# Patient Record
Sex: FEMALE | Race: WHITE | NOT HISPANIC OR LATINO | URBAN - METROPOLITAN AREA
[De-identification: names, ages, dates, MRNs, and addresses within clinical notes are randomized per-mention and may not be internally consistent; named-entity substitution may affect disease eponyms.]

---

## 2020-06-04 ENCOUNTER — RECORDS - HEALTHEAST (OUTPATIENT)
Dept: LAB | Facility: CLINIC | Age: 80
End: 2020-06-04

## 2020-06-05 LAB
ALBUMIN SERPL-MCNC: 4 G/DL (ref 3.5–5)
ALP SERPL-CCNC: 71 U/L (ref 45–120)
ALT SERPL W P-5'-P-CCNC: 22 U/L (ref 0–45)
ANION GAP SERPL CALCULATED.3IONS-SCNC: 10 MMOL/L (ref 5–18)
AST SERPL W P-5'-P-CCNC: 28 U/L (ref 0–40)
BILIRUB SERPL-MCNC: 0.7 MG/DL (ref 0–1)
BNP SERPL-MCNC: 66 PG/ML (ref 0–151)
BUN SERPL-MCNC: 16 MG/DL (ref 8–28)
CALCIUM SERPL-MCNC: 9.3 MG/DL (ref 8.5–10.5)
CHLORIDE BLD-SCNC: 102 MMOL/L (ref 98–107)
CO2 SERPL-SCNC: 29 MMOL/L (ref 22–31)
CREAT SERPL-MCNC: 0.83 MG/DL (ref 0.6–1.1)
GFR SERPL CREATININE-BSD FRML MDRD: >60 ML/MIN/1.73M2
GLUCOSE BLD-MCNC: 104 MG/DL (ref 70–125)
POTASSIUM BLD-SCNC: 3.8 MMOL/L (ref 3.5–5)
PROT SERPL-MCNC: 6.5 G/DL (ref 6–8)
SODIUM SERPL-SCNC: 141 MMOL/L (ref 136–145)
TSH SERPL DL<=0.005 MIU/L-ACNC: 1.48 UIU/ML (ref 0.3–5)
VIT B12 SERPL-MCNC: 750 PG/ML (ref 213–816)

## 2020-07-01 ENCOUNTER — PATIENT OUTREACH (OUTPATIENT)
Dept: CARE COORDINATION | Facility: CLINIC | Age: 80
End: 2020-07-01

## 2020-07-01 DIAGNOSIS — Z65.9 PSYCHOSOCIAL PROBLEM: Primary | ICD-10-CM

## 2020-07-01 ASSESSMENT — ACTIVITIES OF DAILY LIVING (ADL): DEPENDENT_IADLS:: COOKING;LAUNDRY;TRANSPORTATION

## 2020-07-01 NOTE — PROGRESS NOTES
"Clinic Care Coordination Contact    Clinic Care Coordination Contact  OUTREACH    Referral Information:  Referral Source: Care Team    Primary Diagnosis: Cognitive Impairment    Chief Complaint   Patient presents with     Clinic Care Coordination - Initial     CC RAULITO initial        Clinical Concerns:  Current Medical Concerns: Patient has a hard time walking due to her knees, so she uses a walk and recently her daughter purchased a wheelchair for transportation purposes.     Patient is incontinent, wears incontinent pads, she throws them in trash bags without disposing of the bags. Her daughter said she will smell like urine, her house will smell like urine, but patient isn't bathing often.   Patient is having a neurological examination done in July as suggest by Dr. Staples.   Patient's daughter Rosalinda would like to look into options for LTC.   CC RAULITO gave her Cody Noriega's number at [facility].     Current Behavioral Concerns: Patient has a problem with hoarding, her daughter states that her house is very unsafe due to all of the trash and items patient has collected. She is unable to get down the hallway. She has a very hard time throwing things away and doesn't want to.   Patient can't walk up and down steps so she is unable to stay with her daughter or son.   Patient will not family in her home, her daughter believes , \"she is ashamed of the mess.\"     Education Provided to patient: Introduced daughter to self and role. Discussed the process of POA and RAULITO suggested that daughter talk to her  about obtaining POA. RAULITO and Rosalinda discussed the process of Elderly Waiver and the different levels of care at assisted living facilities.    Pain  Pain (GOAL):: No  Health Maintenance Reviewed: Not assessed      Medication Management:  NA    Functional Status:  Patient is not fixing her own food as she is unable to cook for herself, patient eats a lot of fast food. When staying with her daughter she gained 8 " lbs due to her daughter ensuring that her mom had meals.   Dependent ADLs:: Eating  Dependent IADLs:: Cooking, Laundry, Transportation  Bed or wheelchair confined:: No  Mobility Status: Independent w/Device  Fallen 2 or more times in the past year?: Rosalinda believes that her mom has fall due to having black and blue marks.     Any fall with injury in the past year?: No    Living Situation:  Current living arrangement:: I live in a private home  [facility]    Lifestyle & Psychosocial Needs:    Diet:: Regular  Inadequate nutrition (GOAL):: Yes  Tube Feeding: No  Inadequate activity/exercise (GOAL):: No  Significant changes in sleep pattern (GOAL): No  Transportation means:: Family     Amish or spiritual beliefs that impact treatment:: No  Mental health DX:: Yes  Mental health DX how managed:: None  Informal Support system:: Family        Resources and Interventions:  Current Resources:  None at the moment        Supplies used at home:: Incontinence Supplies  Equipment Currently Used at Home: walker, rolling, cane, straight     Goals:   Goals        General    1. Transition to Long Term Care (pt-stated)     Notes - Note created  7/1/2020  3:53 PM by Joi Gardiner LSW    Goal Statement: Daughter will  help people transition into LTC housing.   Date Goal set: 7/1/2020  Barriers: Patient does not want to move out of her home.   Strengths: Strong family support  Date to Achieve By: 10/1/2020  Patient expressed understanding of goal: Yes  Action steps to achieve this goal:  1. Daughter will call resources to assist in patient transitioning to long term care  2. Daughter will talk to  about gaining POA.   3. Daughter will call SW if patient has concerns or questions.       2.Other     Notes - Note created  7/1/2020  3:58 PM by Joi Gardiner LSW    Goal Statement: Patient's house will be cleaned due to hoarding.   Date Goal set: 7/1/2020  Barriers: Patient not wanting  belongings to be thrown away.   Strengths: Strong Family Support  Date to Achieve By: 9/1/2020  Patient expressed understanding of goal: Yes  Action steps to achieve this goal:  1. Patient's family will work with CC SW to find a company to clean patient's due to hoarding.   2. Daughter will call SW if any questions or concerns arise.               Patient/Caregiver understanding: Patient verbalized understanding, engaged in AIDET communication during patient encounter.    Outreach Frequency: monthly       Plan: Rosalinda to call Cody Noriega re: options for long term care facilities. CC SW to find cleaning company to assist in cleaning patients house. SW to assist Rosalinda in finding patient Long term Care for the patient.     Will call Rosalinda in 2 weeks for update after neurological exam.       TEAGAN Hernandez  Clinic Care Coordinator  [phone]  [email or web address]

## 2020-07-01 NOTE — PROGRESS NOTES
Clinic Care Coordination Contact  [facility]/Voicemail    Referral Source: Care Team  Clinical Data: Care Coordinator Outreach  Outreach attempted x 1.  Left message on patient's  daughter's voicemail with call back information and requested return call.  Plan:  Care Coordinator will try to reach patient again in 1-2 business days.    TEAGAN Hernandez  Clinic Care Coordinator  [phone]  [email or web address]

## 2020-07-09 ENCOUNTER — PATIENT OUTREACH (OUTPATIENT)
Dept: CARE COORDINATION | Facility: CLINIC | Age: 80
End: 2020-07-09

## 2020-07-09 DIAGNOSIS — Z65.9 PSYCHOSOCIAL PROBLEM: Primary | ICD-10-CM

## 2020-07-09 NOTE — PROGRESS NOTES
"Clinic Care Coordination Contact    Follow Up Progress Note      Assessment: MOHIT CABEZAS called patient's daughter due to patient calling office today stating that someone from the office was calling her, patient also wanted to know what referrals she had scheduled. Patient did not go to her psychiatric evaluation due to anxiety, Brigitte stated that she had high anxiety and got diarrhea so she refused to go. Brigitte rescheduled appointment for 7/30/2020.     Brigitte and family have concerns regarding her living in her home due to her excessive hoarding. Patient is unsafe with mobility in her home, patient refuses a life alert, refuses to let anyone clean her home. It is too much for the patient to clean at this point due to the amount of \"stuff\" she has in her home. Patient does not let family in the home as well.    Brigitte states that patient doesn't bathe and this is concerning to her. Patient does not have washing machine or dryer, but wont let her family wash her clothes or get her a new washer or dryer.     Intervention/Education provided during outreach: Discussed with Brigitte about making a [facility] report due to safety concerns. Patient can't get to stove to make herself food, is unable to get to refrigerator, and there is not a clear path for patient to walk around in her house. Patient is sleeping on the couch due to not being able to get to her bedroom. Patient has garbage all over her yard and home. Discussed getting patient on MA so that Brigitte can look into Long term care facilities, but at this point patient states she will not move out of home. Brigitte would like to transition patient to long term care for safety needs.      Outreach Frequency: monthly    Plan: MOHIT CABEZAS to make VA report due to patient's safety concerns. Will update  regarding patient's anxiety with going to psychiatric evaluation, rescheduled 7/30/2020. Will message  for advisement on next steps.     Care Coordinator will follow up in 3 weeks. "       TEAGAN Hernandez  Clinic Care Coordinator  [phone]  [email or web address]

## 2020-07-22 ENCOUNTER — PATIENT OUTREACH (OUTPATIENT)
Dept: CARE COORDINATION | Facility: CLINIC | Age: 80
End: 2020-07-22

## 2020-07-22 NOTE — PROGRESS NOTES
Clinic Care Coordination Contact  Care Team Conversations    RAULITO received call from Kira Winstoneny from [facility] ([phone]). She was calling to inquire additional information regarding patient safety/ needs. Kira wanted to know if patient had mobility issues and if this contributed to her being unsafe in her home in addition to the hoarding. RAULITO told Kira that she does have gait and mobility issues and with her home being cluttered without a safe path puts the patient at risk. Kira said the next step would be to seek guardianship for patient, she asked if patient's daughter had guardianship. At this point RAULITO is aware that Rosalinda has POA, but not guardianship. Kira would like to seek a Physicians Support Statement to help Rosalinda gain guardianship; if Rosalinda would to be amenable to guardianship.     Kira to call Rosalinda and discuss next steps. Kira emailed RAULITO the form for the physician to fill out. RAULITO to discuss guardianship paperwork   with PCP.   TEAGAN Hernandez  Clinic Care Coordinator  [phone]  [email or web address]

## 2020-07-29 ENCOUNTER — PATIENT OUTREACH (OUTPATIENT)
Dept: CARE COORDINATION | Facility: CLINIC | Age: 80
End: 2020-07-29

## 2020-07-29 NOTE — PROGRESS NOTES
Clinic Care Coordination Contact  [facility]/Voicemail       Clinical Data: Care Coordinator Outreach  Outreach attempted x 1.  Left message on patient's daughter; Rosalinda's  voicemail with call back information and requested return call.  Plan:Care Coordinator will try to reach patient again in 3-5 business days.      TEAGAN Hernandez  Clinic Care Coordinator  [phone]  [email or web address]

## 2020-07-31 ENCOUNTER — PATIENT OUTREACH (OUTPATIENT)
Dept: CARE COORDINATION | Facility: CLINIC | Age: 80
End: 2020-07-31

## 2020-07-31 DIAGNOSIS — Z65.9 PSYCHOSOCIAL PROBLEM: Primary | ICD-10-CM

## 2020-07-31 NOTE — PROGRESS NOTES
Clinic Care Coordination Contact    Follow Up Progress Note      Assessment:  Patient's daughter called SW this morning to update on patient's [facility] evaluation. Rosalinda stated that the outcome of the appointment was that the patient is exhibiting signs of frontal lobe dementia and the doctor suggests that the patient move into a LTC facility specifically into memory care. Rosalinda states that she was not expecting patient to have to go into memory care, but writer and SW discussed the positives about this transition. Writer gave patient the number for [facility]: [phone] to discuss next steps in proceeding with the long term care options.   Rosalinda stated that as of now there has not been a inspection scheduled for the patient's home. Rosalinda discussed that she and her brother have full POA and aren't sure about gaining guardianship. She believes that if she get her mom (patient) into a LTC then guardianship will not be needed.     Goals addressed this encounter:   Goals Addressed                 This Visit's Progress       Patient Stated      1. Transition to Long Term Care (pt-stated)   On track     Goal Statement: Daughter will  help people transition into LTC housing.   Date Goal set: 7/1/2020  Barriers: Patient does not want to move out of her home.   Strengths: Strong family support  Date to Achieve By: 10/1/2020  Patient expressed understanding of goal: Yes  Action steps to achieve this goal:  1. Daughter will call resources to assist in patient transitioning to long term care  2. Daughter will talk to  about gaining POA.   3. Daughter will call SW if patient has concerns or questions.          Other      2.Other   On Hold     Goal Statement: Patient's house will be cleaned due to hoarding.   Date Goal set: 7/1/2020  Barriers: Patient not wanting belongings to be thrown away.   Strengths: Strong Family Support  Date to Achieve By: 9/1/2020  Patient expressed understanding of goal: Yes  Action  steps to achieve this goal:  1. Patient's family will work with CC RAULITO to find a company to clean patient's due to hoarding.   2. Daughter will call SW if any questions or concerns arise.                Intervention/Education provided during outreach: SW validated Rosalinda's concerns and helped create a plan for next steps. Rosalinda is to call [facility] and look into LTC. RAULITO to send [employer]al[employer] to plan on having a company come out to clean the home. Rosalinda believes this would be very mentally exhausting and draining for her and her family to do, so having an outside company assist with this would be very helpful.      Outreach Frequency: monthly    Plan: Rosalinda to call [facility] to get the process started for housing. SW to sent Rosalinda cleaning companies that specialize in hoarding.   Care Coordinator will follow up in 1 week.     TEAGAN Hernandez  Clinic Care Coordinator  [phone]  [email or web address]

## 2020-08-04 NOTE — PROGRESS NOTES
Clinic Care Coordination Contact  Care Team Conversations  Late Entry 7/31/2020  Cody Noriega From [facility] called CC SW to state that he had received a call from patient's daughter Rosalinda. He will be working with the family to help the find a LTC facility that fits the patients needs and finances.     TEAGAN Hernandez  Clinic Care Coordinator  [phone]  [email or web address]

## 2020-08-11 ENCOUNTER — PATIENT OUTREACH (OUTPATIENT)
Dept: CARE COORDINATION | Facility: CLINIC | Age: 80
End: 2020-08-11

## 2020-08-11 DIAGNOSIS — Z65.9 PSYCHOSOCIAL PROBLEM: Primary | ICD-10-CM

## 2020-08-11 SDOH — ECONOMIC STABILITY: FOOD INSECURITY: WITHIN THE PAST 12 MONTHS, YOU WORRIED THAT YOUR FOOD WOULD RUN OUT BEFORE YOU GOT MONEY TO BUY MORE.: NEVER TRUE

## 2020-08-11 SDOH — ECONOMIC STABILITY: INCOME INSECURITY: HOW HARD IS IT FOR YOU TO PAY FOR THE VERY BASICS LIKE FOOD, HOUSING, MEDICAL CARE, AND HEATING?: NOT HARD AT ALL

## 2020-08-11 SDOH — ECONOMIC STABILITY: TRANSPORTATION INSECURITY
IN THE PAST 12 MONTHS, HAS LACK OF TRANSPORTATION KEPT YOU FROM MEETINGS, WORK, OR FROM GETTING THINGS NEEDED FOR DAILY LIVING?: NO

## 2020-08-11 SDOH — SOCIAL STABILITY: SOCIAL INSECURITY
WITHIN THE LAST YEAR, HAVE YOU BEEN KICKED, HIT, SLAPPED, OR OTHERWISE PHYSICALLY HURT BY YOUR PARTNER OR EX-PARTNER?: NOT ASKED

## 2020-08-11 SDOH — HEALTH STABILITY: PHYSICAL HEALTH: ON AVERAGE, HOW MANY MINUTES DO YOU ENGAGE IN EXERCISE AT THIS LEVEL?: 0 MIN

## 2020-08-11 SDOH — SOCIAL STABILITY: SOCIAL INSECURITY
WITHIN THE LAST YEAR, HAVE TO BEEN RAPED OR FORCED TO HAVE ANY KIND OF SEXUAL ACTIVITY BY YOUR PARTNER OR EX-PARTNER?: NOT ASKED

## 2020-08-11 SDOH — ECONOMIC STABILITY: FOOD INSECURITY: WITHIN THE PAST 12 MONTHS, THE FOOD YOU BOUGHT JUST DIDN'T LAST AND YOU DIDN'T HAVE MONEY TO GET MORE.: NEVER TRUE

## 2020-08-11 SDOH — HEALTH STABILITY: PHYSICAL HEALTH: ON AVERAGE, HOW MANY DAYS PER WEEK DO YOU ENGAGE IN MODERATE TO STRENUOUS EXERCISE (LIKE A BRISK WALK)?: 0 DAYS

## 2020-08-11 SDOH — ECONOMIC STABILITY: TRANSPORTATION INSECURITY
IN THE PAST 12 MONTHS, HAS THE LACK OF TRANSPORTATION KEPT YOU FROM MEDICAL APPOINTMENTS OR FROM GETTING MEDICATIONS?: NO

## 2020-08-11 SDOH — SOCIAL STABILITY: SOCIAL INSECURITY
WITHIN THE LAST YEAR, HAVE YOU BEEN HUMILIATED OR EMOTIONALLY ABUSED IN OTHER WAYS BY YOUR PARTNER OR EX-PARTNER?: NOT ASKED

## 2020-08-11 SDOH — SOCIAL STABILITY: SOCIAL INSECURITY: WITHIN THE LAST YEAR, HAVE YOU BEEN AFRAID OF YOUR PARTNER OR EX-PARTNER?: NOT ASKED

## 2020-08-11 NOTE — PROGRESS NOTES
"Clinic Care Coordination Contact    Follow Up Progress Note      Assessment: RAULITO called patient's daughter for update. She stated that her and her brother continue to work with Kira Lobo from [facility].   Rosalinda states,  \"that it has not been easy finding a LTC that takes Elderly Waiver.\" She would like to be able to use Elderly Waiver to help pay for the long term care facility. Rosalinda is working with Cody Noriega ( [facility]) on finding facilities that take EW. Rosalinda has put a hold on a facility called [facility] in [place] and has her mom on waiting list with the same facility in [place].  She has two virtual tours scheduled as well with two different facilities.     Rosalinda is making sure that the facility has memory care as she is aware that the patient's dementia can decline quickly and would like to have the ability to have a memory care available.     Kira had an inspection of the home scheduled for the past Monday, 8/10/2020. Rosalinda stated \"that her brother was angry about this as he was out of town and he wanted to be there and didn't believe it was necessary for the crisis team to be involved.\" Rosalinda discussed this with Kira and it was explained that, \" It is easier when family isn't there as it keeps the family out of process and the crisis team is there incase the patient has a reaction to being told she can't return to her home. Rosalinda told writer \"she understands this, it's just been very difficult process.\"     At this time the patient is not talking to Rosalinda due to the patient receiving a bill from the   Rosalinda was working with; the bill noted [employer] was involved with the patient. The patient read this and since has not spoken to Rosalinda.     Patient has an appointment scheduled with her PCP tomorrow,  Rosalinda stated \"that she plans on attending.\"   Goals addressed this encounter:   Goals Addressed                 This Visit's Progress       Patient Stated      1. " "Transition to Long Term Care (pt-stated)   On track     Goal Statement: Daughter will help patient transition into LTC housing within the next 4 months.   Date Goal set: 7/1/2020  Barriers: Patient does not want to move out of her home.   Strengths: Strong family support  Date to Achieve By: 10/1/2020  Patient expressed understanding of goal: Yes  Action steps to achieve this goal:  1. Daughter will call resources to assist in patient transitioning to long term care  2. Daughter will talk to  about gaining POA.   3. Daughter will call SW if patient has concerns or questions.          Other      2.Other   On track     Goal Statement: Patient's house will be cleaned due to hoarding with in the next 3 months.  Date Goal set: 7/1/2020  Barriers: Patient not wanting belongings to be thrown away, anxiety  Strengths: Strong Family Support  Date to Achieve By: 9/1/2020  Patient expressed understanding of goal: Yes  Action steps to achieve this goal:  1. Patient's family will work with  SW to find a company to clean patient's due to hoarding.   2. Daughter will call SW if any questions or concerns arise.                Intervention/Education provided during outreach: Discussed the process of utilizing EW for LTC. Provided supportive listening and validation of the process of getting the patient into a safer environment.      Outreach Frequency: weekly    Plan: Patient to attend her appointment on 8/12/2020. Rosalinda and her family will continue to work with Kira Lobo. Rosalinda to update RAULITO with any news about finding a LTC.   Care Coordinator will follow up in 1 week.     TEAGAN Hernandez  Clinic Care Coordinator  [phone]  [email or web address]      Clinic Care Coordination Contact  Care Team Conversations    RAULITO wrote an email to Kira Lobo stating, \"Have you rescheduled the inspection? I spoke to Rosalinda and she said it was scheduled, but now it has been rescheduled.\"    Kira responded, \" I have not yet.  I " "told family I would wait for the Neurology report.\" Kira requested the patient's recently neuro psych exam.     SW to wait for update from Rosalinda and or Kira Lobo for next steps.           "

## 2020-08-21 ENCOUNTER — PATIENT OUTREACH (OUTPATIENT)
Dept: CARE COORDINATION | Facility: CLINIC | Age: 80
End: 2020-08-21

## 2020-08-21 DIAGNOSIS — Z65.9 PSYCHOSOCIAL PROBLEM: Primary | ICD-10-CM

## 2020-08-21 NOTE — LETTER
[facility]  Complex Care Plan  About Me:    Patient Name:  Wayne Zavala    YOB: 1940  Age:         80 year old   Mandeep MRN:    [id number] Telephone Information:  Home Phone [phone]   Mobile Not on file.       Address:  [address] [facility] [address] Email address:  No e-mail address on record      Emergency Contact(s)    Name Relationship Lgl Grd Work Phone Home Phone Mobile Phone   1. MAYDA ZAVALA*    [phone]    2. RAMOS ZAVALA*    [phone]            Primary language:  English     needed? Data Unavailable   [employer]:  [phone] op. 1  Other communication barriers: None  Preferred Method of Communication:     Current living arrangement:    Mobility Status/ Medical Equipment:      Health Maintenance  Health Maintenance Reviewed: Up to date    My Access Plan  Medical Emergency 911   Primary Clinic Line [facility] - [phone]   24 Hour Appointment Line [phone] or  [phone]) (toll-free)   24 Hour Nurse Line [phone] (toll-free)   Preferred Urgent Care     Preferred Hospital     Preferred Pharmacy No Pharmacies Listed   Behavioral Health Crisis Line The National Suicide Prevention Lifeline at 1-224.197.3937 or 913             My Care Team Members  Patient Care Team       Relationship Specialty Notifications Start End    Kuldip Staples MD PCP - General Family Practice  8/21/20     Phone: [phone] Fax: [phone]         [facility] [address] [facility] [address]    Joi Gardiner LSW Lead Care Coordinator   7/1/20             My Care Plans  Self Management and Treatment Plan  Goals and (Comments)  Goals        General    1. Transition to Long Term Care (pt-stated)     Notes - Note edited  8/11/2020  2:40 PM by Joi Gardiner LSW    Goal Statement: Daughter will help patient transition into LTC housing within the  next 4 months.   Date Goal set: 7/1/2020  Barriers: Patient does not want to move out of her home.   Strengths: Strong family support  Date to Achieve By: 10/1/2020  Patient expressed understanding of goal: Yes  Action steps to achieve this goal:  1. Daughter will call resources to assist in patient transitioning to long term care  2. Daughter will talk to  about gaining POA.   3. Daughter will call SW if patient has concerns or questions.       2.Other     Notes - Note edited  8/11/2020  2:39 PM by Joi Gardiner LSW    Goal Statement: Patient's house will be cleaned due to hoarding with in the next 3 months.  Date Goal set: 7/1/2020  Barriers: Patient not wanting belongings to be thrown away, anxiety  Strengths: Strong Family Support  Date to Achieve By: 9/1/2020  Patient expressed understanding of goal: Yes  Action steps to achieve this goal:  1. Patient's family will work with [facility] to find a company to clean patient's due to hoarding.   2. Daughter will call SW if any questions or concerns arise.                     Advance Care Plans/Directives Type:        My Medical and Care Information  Problem List   There is no problem list on file for this patient.     Current Medications and Allergies:  See printed Medication Report.    Care Coordination Start Date: 7/1/2020   Frequency of Care Coordination: weekly   Form Last Updated: 08/21/2020

## 2020-08-21 NOTE — PROGRESS NOTES
"Clinic Care Coordination Contact    Follow Up Progress Note:     Assessment: Patient called patient's daughter today on updates on LTC facilities. Rosalinda has done multiple virtual tours with LTC facilities; she states she has seen/ talked to 14 places. Rosalinda confirmed that Kira Lobo has all the documents she needs with going forward.  Rosalinda filled SW in on doctor appointment with patient, Rosalinda stated, \" it was a disaster.\" Patient hung up on PC during the tele visit. Patient to make an appointment with Dr. Staples in person to discuss dementia dx. Rosalinda is getting frustrated with the process in looking at facilities.     Kiar Lobo has not scheduled inspection yet, she is waiting to hear from Rosalinda to ensure patient has housing. Rosalinda has a few options, has put a hold on a place. She is just waiting to hear back from the facilities.     Goals addressed this encounter:   Goals Addressed                 This Visit's Progress       Patient Stated      1. Transition to Long Term Care (pt-stated)   40%     Goal Statement: Daughter will help patient transition into LTC housing within the next 4 months.   Date Goal set: 7/1/2020  Barriers: Patient does not want to move out of her home.   Strengths: Strong family support  Date to Achieve By: 10/1/2020  Patient expressed understanding of goal: Yes  Action steps to achieve this goal:  1. Daughter will call resources to assist in patient transitioning to long term care  2. Daughter will talk to  about gaining POA.   3. Daughter will call SW if patient has concerns or questions.          Other      2.Other   On Hold     Goal Statement: Patient's house will be cleaned due to hoarding with in the next 3 months.  Date Goal set: 7/1/2020  Barriers: Patient not wanting belongings to be thrown away, anxiety  Strengths: Strong Family Support  Date to Achieve By: 9/1/2020  Patient expressed understanding of goal: Yes  Action steps to achieve this goal:  1. Patient's family " will work with CC SW to find a company to clean patient's due to hoarding.   2. Daughter will call SW if any questions or concerns arise.                Intervention/Education provided during outreach: Discussed next steps; Rosalinda is continuing to look at LTC facilities, she is waiting to hear back from facilities.   Outreach Frequency: weekly    Plan: Rosalinda to continue looking at LTC facilities and work with Kira Maciel. Kira to schedule inspection and crisis team to go to house.  SW to follow up with Rosalinda in 2 weeks to check how things are going.   Care Coordinator will follow up in 2 weeks.       TEAGAN Hernandez  Clinic Care Coordinator  [phone]  [email or web address]

## 2020-08-21 NOTE — LETTER
[facility]   August 21, 2020      Dear Rosalinda,    I am a clinic  who works with Kuldip Staples MD at [facility]. I wanted to thank you for spending the time to talk with me.  Below is a description of clinic care coordination and how I can further assist you.      The goal of clinic care coordination is to help you manage your health and improve access to the health care system in the most efficient manner. The team can assist you in meeting your health care goals by providing education, coordinating services, strengthening the communication among your providers and supporting you with any resource needs.    Please feel free to contact me at [phone] with any questions or concerns. We are focused on providing you with the highest-quality healthcare experience possible and that all starts with you.     Sincerely,     TEAGAN Hernandez  Clinic Care Coordinator  [phone]  [email or web address]

## 2020-08-27 ENCOUNTER — PATIENT OUTREACH (OUTPATIENT)
Dept: CARE COORDINATION | Facility: CLINIC | Age: 80
End: 2020-08-27

## 2020-08-27 DIAGNOSIS — Z65.9 PSYCHOSOCIAL PROBLEM: Primary | ICD-10-CM

## 2020-08-27 NOTE — PROGRESS NOTES
Clinic Care Coordination Contact    Follow Up Progress Note      Assessment: Patient's daughter called to update  RAULITO on her progress on finding a LTC. Patient's daughter has a place that she really likes and has put down a deposit to hold it. She has also has a back up place that she likes incase the first place doesn't work out. Rosalinda has been told that the patient will most likely need to be admitted into the hospital first to have an assessment and be regulated prior to being admitted into the LTC because the patient is not a willing participant in the process of moving out of her home. Writer encouraged Rosalinda to check with Kira Lobo from the [facility] for next steps and to update her on where they are at as far as housing options.     Goals addressed this encounter:   Goals Addressed                 This Visit's Progress       Patient Stated      1. Transition to Long Term Care (pt-stated)   50%     Goal Statement: Daughter will help patient transition into LTC housing within the next 4 months.   Date Goal set: 7/1/2020  Barriers: Patient does not want to move out of her home.   Strengths: Strong family support  Date to Achieve By: 10/1/2020  Patient expressed understanding of goal: Yes  Action steps to achieve this goal:  1. Daughter will call resources to assist in patient transitioning to long term care  2. Daughter will talk to  about gaining POA.   3. Daughter will call  if patient has concerns or questions.     8/27/2020- Patient's daughter has a hold on two long term care facilities. She is waiting to hear from the facilities on their decision if they are going to accept her. Goal 50%         Other      2.Other   On Hold     Goal Statement: Patient's house will be cleaned due to hoarding with in the next 3 months.  Date Goal set: 7/1/2020  Barriers: Patient not wanting belongings to be thrown away, anxiety  Strengths: Strong Family Support  Date to Achieve By: 9/1/2020  Patient expressed  understanding of goal: Yes  Action steps to achieve this goal:  1. Patient's family will work with CC SW to find a company to clean patient's due to hoarding.   2. Daughter will call SW if any questions or concerns arise.                Intervention/Education provided during outreach: Discussed next steps of patient going into a LTC.      Outreach Frequency: weekly    Plan:  Rosalinda to update Kira on where she is on finding a LTC and inquire what the next steps are with the county. Rosalinda to update CC SW and make CC SW aware what next steps are. CC SW will update PCP when writer knows next steps .   Care Coordinator will follow up in 3 weeks.

## 2020-09-10 ENCOUNTER — PATIENT OUTREACH (OUTPATIENT)
Dept: CARE COORDINATION | Facility: CLINIC | Age: 80
End: 2020-09-10

## 2020-09-10 NOTE — PROGRESS NOTES
"Clinic Care Coordination Contact    Follow Up Progress Note      Assessment: MOHIT CABEZAS received e-mail from Kira Lobo from [facility] at 7:56am asking, \"I am hoping we can get a letter from Wayne's doctor ASAP requesting that she be held and evaluated at a Geriatric Psych Unit.I am coordinating with Rosalinda and our Crisis Unit and Police to have her removed from the home today.  We are planning on having her transported to [facility] and are hoping she can be held and evaluated by their Geriatric Psych Unit because then [facility], the locked memory care unit has agreed to admit Wayne.  All of these moving parts need to happen successfully and it would really help if we could get a letter from her Primary Care physician supporting this and requesting the evaluation at Geriatric Psych also.  Can you please ask this of him ASAP as we were planning on acting on this today.    MOHIT CABEZAS relayed this message to PCP- PCP wrote letter and it was sent to Kira at 8:38.     10:00 - Kira e-mailed MOHIT CABEZAS to state they were all headed to the patient's home at 11:00am.    1:12pm- E-mail was received from Kira,\" It is sounding a little like [employer] does not feel she needs Geriatric Psych Eval and it may help to have her physician call up and talk.\"    1:04PM- MOHIT CABEZAS noted that  Dr Staples noted in patient's chart;\" d/w ER doc. She will be transferred to memory care or to geriatric psych input unit for evaluation.\"    2:00 PM- MOHIT CABEZAS called Rosalinda to check in with her while she was at the ED with the patient. She stated that the psychiatrist at the hospital did not think the patient needed to be there and would not be putting the patient in geriatric psych. Rosalinda said she didn't know what she is going to do if the patient wasn't admitted. She stated that the staff wants her to bring the patient home with her until she can go to [facility]. Rosalinda told the staff and writer that the patient is unable to use her stairs in her home " "and she doesn't feel it's safe to bring her to her home. She is not sure what to do, writer told Rosalinda that  RAULITO would email Kira.     2:15-  RAULITO wrote an email to Kira stating that due to patient not being admitted to the hospital, Rosalinda doesn't know what to do, what are the next steps.       2:30pm - Kira Maciel called RAULITO to update MOHIT CABEZAS. Kira stated that she met patient at her house unscheduled, the patient was not aware that Kira and her team were arriving, upon arrival the patient was well dressed, clean, she appropriately answered questions and was participating in everything Kira asked. She went willingly to [facility] and upon arrival she was given a psych assessment, the crisis team did not have to participate as Rosalinda and Kira anticipated. Up to this point Kira had only had conversations with patient's and daughter, she had no contact with the patient up to meeting her yesterday. She felt surprised by patient's willingness to engage and participate as she had been informed by family that the patient would be angry and unwilling to leave her home. The patient's home was labeled inhabitable.   The psychiatrist who did the assessment stated that there is no reason to keep the patient on a hold as she tested well on the ED [facility] assessment. Kira stated, \" She did not see any deficits, the patient participated in all tests willingly and the physicians in the ED were questioning her diagnosis of dementia.\" Kira stated\" that she felt that there is a missing piece to this situation as she feels that the patient is perfectly capable of making her own decisions and she is questioning whether she is a vulnerable adult.\"  Kira has updated and expressed her concerns with Yamileth at [facility] as Kira does not feel the patient needs to be in a locked memory care facility. Yamileth stated that patient will receive an assessment once she arrives and if they feel she does not qualify for memory care then " they will move her into assisted living. Kira stated that she feels very uncomfortable with the situation as she feels that the patient's behaviors were exaggerated by the patient's children.     Goals addressed this encounter:   Goals Addressed                 This Visit's Progress       Patient Stated      1. Transition to Long Term Care (pt-stated)   60%     Goal Statement: Daughter will help patient transition into LTC housing within the next 4 months.   Date Goal set: 7/1/2020  Barriers: Patient does not want to move out of her home.   Strengths: Strong family support  Date to Achieve By: 10/1/2020  Patient expressed understanding of goal: Yes  Action steps to achieve this goal:  1. Daughter will call resources to assist in patient transitioning to long term care  2. Daughter will talk to  about gaining POA.   3. Daughter will call SW if patient has concerns or questions.     9/10/2020 Patient has been transferred from her home to [facility] for potential inpatient treatment into the geriatric psychiatric unit. Goal 50%         Other      2.Other   On Hold     Goal Statement: Patient's house will be cleaned due to hoarding with in the next 3 months.  Date Goal set: 7/1/2020  Barriers: Patient not wanting belongings to be thrown away, anxiety  Strengths: Strong Family Support  Date to Achieve By: 9/1/2020  Patient expressed understanding of goal: Yes  Action steps to achieve this goal:  1. Patient's family will work with MOHIT CABEZAS to find a company to clean patient's due to hoarding.   2. Daughter will call SW if any questions or concerns arise.                   Outreach Frequency: weekly    Plan:   - MOHIT CABEZAS to wait for updates from Kira  -MOHIT CABEZAS to update patient's PCP.   Care Coordinator will follow up in 1 week.      TEAGAN Hernandez  Clinic Care Coordinator  [phone]  [email or web address]

## 2020-09-18 ENCOUNTER — PATIENT OUTREACH (OUTPATIENT)
Dept: CARE COORDINATION | Facility: CLINIC | Age: 80
End: 2020-09-18

## 2020-09-18 NOTE — PROGRESS NOTES
Clinic Care Coordination Contact  [facility]/Voicemail       Clinical Data: Care Coordinator Outreach  Outreach attempted x 1.  Left message on patient's daughter's voicemail with call back information and requested return call.  Plan:  Care Coordinator will try to reach patient again in 1 week.    TEAGAN Hernandez  Clinic Care Coordinator  [phone]  [email or web address]

## 2020-09-29 ENCOUNTER — PATIENT OUTREACH (OUTPATIENT)
Dept: CARE COORDINATION | Facility: CLINIC | Age: 80
End: 2020-09-29

## 2020-09-29 NOTE — PROGRESS NOTES
Clinic Care Coordination Contact    Follow Up Progress Note      Assessment: SW called patient's daughter Rosalinda to get an update on how patient is doing and next steps as with last outreach patient's home was deemed unlivable.   Patient is currently living with Rosalinda and patient has signed a contract with [facility] to move in October 1, 2020.   Patient's house is currently being cleaned by a hoarding cleanup team. They began last week and will be continuing through this week.     Goals addressed this encounter:   Goals Addressed                 This Visit's Progress       Patient Stated      1. Transition to Long Term Care (pt-stated)   60%     Goal Statement: Daughter will help patient transition into LTC housing within the next 4 months.   Date Goal set: 7/1/2020  Barriers: Patient does not want to move out of her home.   Strengths: Strong family support  Date to Achieve By: 10/1/2020  Patient expressed understanding of goal: Yes  Action steps to achieve this goal:  1. Daughter will call resources to assist in patient transitioning to long term care  2. Daughter will talk to  about gaining POA.   3. Daughter will call  if patient has concerns or questions.     9/29/2020 Patient has signed contract to move into [facility] in October 1, 2020. Goal 50%         Other      2.Functional   50%     Goal Statement: Patient's house will be cleaned due to hoarding with in the next 3 months.  Date Goal set: 7/1/2020  Barriers: Patient not wanting belongings to be thrown away, anxiety  Strengths: Strong Family Support  Date to Achieve By: 9/1/2020  Patient expressed understanding of goal: Yes  Action steps to achieve this goal:  1. Patient's family will work with  RAULITO to find a company to clean patient's due to hoarding.   2. Daughter will call  if any questions or concerns arise.       9/29/2020 Patient's house deemed unfit to live in, hoarding crew started cleaning house week of 9/21/2020.  Process to take 2 weeks to get house clean. Goal 50%             Intervention/Education provided during outreach: Actively listened to Rosalinda and validated her feelings in regards to the process of getting patient into senior living.      Outreach Frequency: monthly    Plan:  -CC SW to outreach in 2 weeks to check in.     TEAGAN Hernandez  Clinic Care Coordinator  [phone]  [email or web address]

## 2020-10-08 ENCOUNTER — RECORDS - HEALTHEAST (OUTPATIENT)
Dept: LAB | Facility: CLINIC | Age: 80
End: 2020-10-08

## 2020-10-08 LAB
ALBUMIN UR-MCNC: NEGATIVE MG/DL
APPEARANCE UR: CLEAR
BACTERIA #/AREA URNS HPF: ABNORMAL HPF
BILIRUB UR QL STRIP: NEGATIVE
COLOR UR AUTO: YELLOW
GLUCOSE UR STRIP-MCNC: NEGATIVE MG/DL
HGB UR QL STRIP: NEGATIVE
KETONES UR STRIP-MCNC: NEGATIVE MG/DL
LEUKOCYTE ESTERASE UR QL STRIP: ABNORMAL
MUCOUS THREADS #/AREA URNS LPF: ABNORMAL LPF
NITRATE UR QL: NEGATIVE
PH UR STRIP: 5.5 [PH] (ref 4.5–8)
RBC #/AREA URNS AUTO: ABNORMAL HPF
SP GR UR STRIP: 1.02 (ref 1–1.03)
SQUAMOUS #/AREA URNS AUTO: ABNORMAL LPF
UROBILINOGEN UR STRIP-ACNC: ABNORMAL
WBC #/AREA URNS AUTO: ABNORMAL HPF

## 2020-10-09 LAB — BACTERIA SPEC CULT: NO GROWTH

## 2020-10-20 ENCOUNTER — PATIENT OUTREACH (OUTPATIENT)
Dept: CARE COORDINATION | Facility: CLINIC | Age: 80
End: 2020-10-20

## 2020-10-20 DIAGNOSIS — Z65.9 PSYCHOSOCIAL PROBLEM: Primary | ICD-10-CM

## 2020-10-20 NOTE — PROGRESS NOTES
Clinic Care Coordination Contact  [facility]/Voicemail       Clinical Data: Care Coordinator Outreach  Outreach attempted x 1.  Left message on patient's daughter Rosalinda's  voicemail with call back information and requested return call.  Plan: No further outreaches will be made at this time unless a new referral is made or a change in the pt's status occurs. Patient was provided with CC SW contact information and encouraged to call with any questions or concerns.  In chart review patient is switching over to in house Provider for convenience. Pt no longer in need of CC SW.     TEAGAN Hernandez  Clinic Care Coordinator  [phone]  [email or web address]

## 2021-01-01 ENCOUNTER — LAB REQUISITION (OUTPATIENT)
Dept: LAB | Facility: CLINIC | Age: 81
End: 2021-01-01
Payer: COMMERCIAL

## 2021-01-01 DIAGNOSIS — R60.9 EDEMA, UNSPECIFIED: ICD-10-CM

## 2021-01-01 DIAGNOSIS — R06.02 SHORTNESS OF BREATH: ICD-10-CM

## 2021-01-01 DIAGNOSIS — I10 ESSENTIAL (PRIMARY) HYPERTENSION: ICD-10-CM

## 2021-01-01 LAB
ANION GAP SERPL CALCULATED.3IONS-SCNC: 11 MMOL/L (ref 5–18)
ANION GAP SERPL CALCULATED.3IONS-SCNC: 12 MMOL/L (ref 5–18)
ANION GAP SERPL CALCULATED.3IONS-SCNC: 13 MMOL/L (ref 5–18)
ANION GAP SERPL CALCULATED.3IONS-SCNC: 9 MMOL/L (ref 5–18)
ANION GAP SERPL CALCULATED.3IONS-SCNC: 9 MMOL/L (ref 5–18)
BNP SERPL-MCNC: 17 PG/ML (ref 0–159)
BUN SERPL-MCNC: 11 MG/DL (ref 8–28)
BUN SERPL-MCNC: 17 MG/DL (ref 8–28)
BUN SERPL-MCNC: 19 MG/DL (ref 8–28)
BUN SERPL-MCNC: 20 MG/DL (ref 8–28)
BUN SERPL-MCNC: 24 MG/DL (ref 8–28)
CALCIUM SERPL-MCNC: 10.3 MG/DL (ref 8.5–10.5)
CALCIUM SERPL-MCNC: 10.4 MG/DL (ref 8.5–10.5)
CALCIUM SERPL-MCNC: 9.3 MG/DL (ref 8.5–10.5)
CALCIUM SERPL-MCNC: 9.6 MG/DL (ref 8.5–10.5)
CALCIUM SERPL-MCNC: 9.7 MG/DL (ref 8.5–10.5)
CHLORIDE BLD-SCNC: 103 MMOL/L (ref 98–107)
CHLORIDE BLD-SCNC: 103 MMOL/L (ref 98–107)
CHLORIDE BLD-SCNC: 104 MMOL/L (ref 98–107)
CHLORIDE BLD-SCNC: 106 MMOL/L (ref 98–107)
CHLORIDE BLD-SCNC: 109 MMOL/L (ref 98–107)
CO2 SERPL-SCNC: 23 MMOL/L (ref 22–31)
CO2 SERPL-SCNC: 27 MMOL/L (ref 22–31)
CO2 SERPL-SCNC: 27 MMOL/L (ref 22–31)
CO2 SERPL-SCNC: 28 MMOL/L (ref 22–31)
CO2 SERPL-SCNC: 29 MMOL/L (ref 22–31)
CREAT SERPL-MCNC: 0.79 MG/DL (ref 0.6–1.1)
CREAT SERPL-MCNC: 0.8 MG/DL (ref 0.6–1.1)
CREAT SERPL-MCNC: 0.83 MG/DL (ref 0.6–1.1)
CREAT SERPL-MCNC: 0.84 MG/DL (ref 0.6–1.1)
CREAT SERPL-MCNC: 0.93 MG/DL (ref 0.6–1.1)
GFR SERPL CREATININE-BSD FRML MDRD: 61 ML/MIN/1.73M2
GFR SERPL CREATININE-BSD FRML MDRD: 65 ML/MIN/1.73M2
GFR SERPL CREATININE-BSD FRML MDRD: 66 ML/MIN/1.73M2
GFR SERPL CREATININE-BSD FRML MDRD: 69 ML/MIN/1.73M2
GFR SERPL CREATININE-BSD FRML MDRD: 70 ML/MIN/1.73M2
GLUCOSE BLD-MCNC: 103 MG/DL (ref 70–125)
GLUCOSE BLD-MCNC: 110 MG/DL (ref 70–125)
GLUCOSE BLD-MCNC: 126 MG/DL (ref 70–125)
GLUCOSE BLD-MCNC: 91 MG/DL (ref 70–125)
GLUCOSE BLD-MCNC: 91 MG/DL (ref 70–125)
POTASSIUM BLD-SCNC: 3.9 MMOL/L (ref 3.5–5)
POTASSIUM BLD-SCNC: 4 MMOL/L (ref 3.5–5)
POTASSIUM BLD-SCNC: 4.1 MMOL/L (ref 3.5–5)
POTASSIUM BLD-SCNC: 4.3 MMOL/L (ref 3.5–5)
POTASSIUM BLD-SCNC: 4.6 MMOL/L (ref 3.5–5)
SODIUM SERPL-SCNC: 141 MMOL/L (ref 136–145)
SODIUM SERPL-SCNC: 142 MMOL/L (ref 136–145)
SODIUM SERPL-SCNC: 142 MMOL/L (ref 136–145)
SODIUM SERPL-SCNC: 143 MMOL/L (ref 136–145)
SODIUM SERPL-SCNC: 145 MMOL/L (ref 136–145)

## 2021-01-01 PROCEDURE — 80048 BASIC METABOLIC PNL TOTAL CA: CPT | Mod: ORL | Performed by: NURSE PRACTITIONER

## 2021-01-01 PROCEDURE — 36415 COLL VENOUS BLD VENIPUNCTURE: CPT | Mod: ORL | Performed by: NURSE PRACTITIONER

## 2021-01-01 PROCEDURE — P9604 ONE-WAY ALLOW PRORATED TRIP: HCPCS | Mod: ORL | Performed by: NURSE PRACTITIONER

## 2021-01-01 PROCEDURE — P9603 ONE-WAY ALLOW PRORATED MILES: HCPCS | Mod: ORL | Performed by: NURSE PRACTITIONER

## 2021-01-01 PROCEDURE — 83880 ASSAY OF NATRIURETIC PEPTIDE: CPT | Mod: ORL | Performed by: NURSE PRACTITIONER

## 2021-02-20 ENCOUNTER — RECORDS - HEALTHEAST (OUTPATIENT)
Dept: LAB | Facility: CLINIC | Age: 81
End: 2021-02-20

## 2021-02-23 LAB
ANION GAP SERPL CALCULATED.3IONS-SCNC: 8 MMOL/L (ref 5–18)
BUN SERPL-MCNC: 16 MG/DL (ref 8–28)
CALCIUM SERPL-MCNC: 9.2 MG/DL (ref 8.5–10.5)
CHLORIDE BLD-SCNC: 105 MMOL/L (ref 98–107)
CO2 SERPL-SCNC: 28 MMOL/L (ref 22–31)
CREAT SERPL-MCNC: 0.75 MG/DL (ref 0.6–1.1)
ERYTHROCYTE [DISTWIDTH] IN BLOOD BY AUTOMATED COUNT: 12.7 % (ref 11–14.5)
GFR SERPL CREATININE-BSD FRML MDRD: >60 ML/MIN/1.73M2
GLUCOSE BLD-MCNC: 139 MG/DL (ref 70–125)
HCT VFR BLD AUTO: 39.3 % (ref 35–47)
HGB BLD-MCNC: 12.5 G/DL (ref 12–16)
MCH RBC QN AUTO: 30.4 PG (ref 27–34)
MCHC RBC AUTO-ENTMCNC: 31.8 G/DL (ref 32–36)
MCV RBC AUTO: 96 FL (ref 80–100)
PLATELET # BLD AUTO: 282 THOU/UL (ref 140–440)
PMV BLD AUTO: 10.5 FL (ref 8.5–12.5)
POTASSIUM BLD-SCNC: 3.9 MMOL/L (ref 3.5–5)
RBC # BLD AUTO: 4.11 MILL/UL (ref 3.8–5.4)
SODIUM SERPL-SCNC: 141 MMOL/L (ref 136–145)
VIT B12 SERPL-MCNC: 752 PG/ML (ref 213–816)
WBC: 6 THOU/UL (ref 4–11)

## 2021-08-09 ENCOUNTER — LAB REQUISITION (OUTPATIENT)
Dept: LAB | Facility: CLINIC | Age: 81
End: 2021-08-09
Payer: COMMERCIAL

## 2021-08-09 DIAGNOSIS — R03.0 ELEVATED BLOOD-PRESSURE READING, WITHOUT DIAGNOSIS OF HYPERTENSION: ICD-10-CM

## 2021-08-09 DIAGNOSIS — M79.10 MYALGIA, UNSPECIFIED SITE: ICD-10-CM

## 2021-08-10 LAB
ANION GAP SERPL CALCULATED.3IONS-SCNC: 9 MMOL/L (ref 5–18)
BUN SERPL-MCNC: 18 MG/DL (ref 8–28)
CALCIUM SERPL-MCNC: 9.7 MG/DL (ref 8.5–10.5)
CHLORIDE BLD-SCNC: 103 MMOL/L (ref 98–107)
CK SERPL-CCNC: 81 U/L (ref 30–190)
CO2 SERPL-SCNC: 29 MMOL/L (ref 22–31)
CREAT SERPL-MCNC: 0.71 MG/DL (ref 0.6–1.1)
ERYTHROCYTE [DISTWIDTH] IN BLOOD BY AUTOMATED COUNT: 12.7 % (ref 10–15)
ERYTHROCYTE [SEDIMENTATION RATE] IN BLOOD BY WESTERGREN METHOD: 52 MM/HR (ref 0–20)
FOLATE SERPL-MCNC: 15.6 NG/ML
GFR SERPL CREATININE-BSD FRML MDRD: 80 ML/MIN/1.73M2
GLUCOSE BLD-MCNC: 98 MG/DL (ref 70–125)
HCT VFR BLD AUTO: 40.9 % (ref 35–47)
HGB BLD-MCNC: 13.4 G/DL (ref 11.7–15.7)
MCH RBC QN AUTO: 31.8 PG (ref 26.5–33)
MCHC RBC AUTO-ENTMCNC: 32.8 G/DL (ref 31.5–36.5)
MCV RBC AUTO: 97 FL (ref 78–100)
PLATELET # BLD AUTO: 285 10E3/UL (ref 150–450)
POTASSIUM BLD-SCNC: 3.8 MMOL/L (ref 3.5–5)
RBC # BLD AUTO: 4.22 10E6/UL (ref 3.8–5.2)
SODIUM SERPL-SCNC: 141 MMOL/L (ref 136–145)
TSH SERPL DL<=0.005 MIU/L-ACNC: 1.74 UIU/ML (ref 0.3–5)
VIT B12 SERPL-MCNC: 815 PG/ML (ref 213–816)
WBC # BLD AUTO: 6.7 10E3/UL (ref 4–11)

## 2021-08-10 PROCEDURE — 82550 ASSAY OF CK (CPK): CPT | Mod: ORL | Performed by: INTERNAL MEDICINE

## 2021-08-10 PROCEDURE — 85652 RBC SED RATE AUTOMATED: CPT | Mod: ORL | Performed by: INTERNAL MEDICINE

## 2021-08-10 PROCEDURE — 82746 ASSAY OF FOLIC ACID SERUM: CPT | Mod: ORL | Performed by: INTERNAL MEDICINE

## 2021-08-10 PROCEDURE — 36415 COLL VENOUS BLD VENIPUNCTURE: CPT | Mod: ORL | Performed by: INTERNAL MEDICINE

## 2021-08-10 PROCEDURE — 80048 BASIC METABOLIC PNL TOTAL CA: CPT | Mod: ORL | Performed by: INTERNAL MEDICINE

## 2021-08-10 PROCEDURE — 82607 VITAMIN B-12: CPT | Mod: ORL | Performed by: INTERNAL MEDICINE

## 2021-08-10 PROCEDURE — P9604 ONE-WAY ALLOW PRORATED TRIP: HCPCS | Mod: ORL | Performed by: INTERNAL MEDICINE

## 2021-08-10 PROCEDURE — 85027 COMPLETE CBC AUTOMATED: CPT | Mod: ORL | Performed by: INTERNAL MEDICINE

## 2021-08-10 PROCEDURE — 84443 ASSAY THYROID STIM HORMONE: CPT | Mod: ORL | Performed by: INTERNAL MEDICINE

## 2021-08-16 ENCOUNTER — LAB REQUISITION (OUTPATIENT)
Dept: LAB | Facility: CLINIC | Age: 81
End: 2021-08-16
Payer: COMMERCIAL

## 2021-08-16 DIAGNOSIS — R79.0 ABNORMAL LEVEL OF BLOOD MINERAL: ICD-10-CM

## 2021-08-18 LAB
C REACTIVE PROTEIN LHE: 0.9 MG/DL (ref 0–0.8)
ERYTHROCYTE [SEDIMENTATION RATE] IN BLOOD BY WESTERGREN METHOD: 58 MM/HR (ref 0–20)

## 2021-08-18 PROCEDURE — P9603 ONE-WAY ALLOW PRORATED MILES: HCPCS | Mod: ORL | Performed by: INTERNAL MEDICINE

## 2021-08-18 PROCEDURE — 86141 C-REACTIVE PROTEIN HS: CPT | Mod: ORL | Performed by: INTERNAL MEDICINE

## 2021-08-18 PROCEDURE — 85652 RBC SED RATE AUTOMATED: CPT | Mod: ORL | Performed by: INTERNAL MEDICINE

## 2021-08-18 PROCEDURE — 36415 COLL VENOUS BLD VENIPUNCTURE: CPT | Mod: ORL | Performed by: INTERNAL MEDICINE

## 2022-01-01 ENCOUNTER — LAB REQUISITION (OUTPATIENT)
Dept: LAB | Facility: CLINIC | Age: 82
End: 2022-01-01
Payer: COMMERCIAL

## 2022-01-01 ENCOUNTER — LAB REQUISITION (OUTPATIENT)
Dept: LAB | Facility: CLINIC | Age: 82
End: 2022-01-01
Payer: OTHER MISCELLANEOUS

## 2022-01-01 ENCOUNTER — LAB REQUISITION (OUTPATIENT)
Dept: LAB | Facility: CLINIC | Age: 82
End: 2022-01-01
Payer: MEDICARE

## 2022-01-01 DIAGNOSIS — N39.42 INCONTINENCE WITHOUT SENSORY AWARENESS: ICD-10-CM

## 2022-01-01 DIAGNOSIS — I10 ESSENTIAL (PRIMARY) HYPERTENSION: ICD-10-CM

## 2022-01-01 DIAGNOSIS — R60.9 EDEMA, UNSPECIFIED: ICD-10-CM

## 2022-01-01 DIAGNOSIS — F03.918 UNSPECIFIED DEMENTIA WITH BEHAVIORAL DISTURBANCE: ICD-10-CM

## 2022-01-01 DIAGNOSIS — R20.8 OTHER DISTURBANCES OF SKIN SENSATION: ICD-10-CM

## 2022-01-01 DIAGNOSIS — D64.9 ANEMIA, UNSPECIFIED: ICD-10-CM

## 2022-01-01 DIAGNOSIS — Z01.818 ENCOUNTER FOR OTHER PREPROCEDURAL EXAMINATION: ICD-10-CM

## 2022-01-01 DIAGNOSIS — R39.81 FUNCTIONAL URINARY INCONTINENCE: ICD-10-CM

## 2022-01-01 DIAGNOSIS — Z01.812 ENCOUNTER FOR PREPROCEDURAL LABORATORY EXAMINATION: ICD-10-CM

## 2022-01-01 DIAGNOSIS — I82.401 ACUTE EMBOLISM AND THROMBOSIS OF UNSPECIFIED DEEP VEINS OF RIGHT LOWER EXTREMITY (H): ICD-10-CM

## 2022-01-01 DIAGNOSIS — M84.459A PATHOLOGICAL FRACTURE, HIP, UNSPECIFIED, INITIAL ENCOUNTER FOR FRACTURE (H): ICD-10-CM

## 2022-01-01 LAB
ALBUMIN UR-MCNC: 10 MG/DL
ALBUMIN UR-MCNC: NEGATIVE MG/DL
ANION GAP SERPL CALCULATED.3IONS-SCNC: 10 MMOL/L (ref 5–18)
ANION GAP SERPL CALCULATED.3IONS-SCNC: 10 MMOL/L (ref 5–18)
ANION GAP SERPL CALCULATED.3IONS-SCNC: 11 MMOL/L (ref 5–18)
ANION GAP SERPL CALCULATED.3IONS-SCNC: 11 MMOL/L (ref 5–18)
ANION GAP SERPL CALCULATED.3IONS-SCNC: 12 MMOL/L (ref 5–18)
ANION GAP SERPL CALCULATED.3IONS-SCNC: 13 MMOL/L (ref 5–18)
ANION GAP SERPL CALCULATED.3IONS-SCNC: 14 MMOL/L (ref 5–18)
ANION GAP SERPL CALCULATED.3IONS-SCNC: 7 MMOL/L (ref 5–18)
APPEARANCE UR: ABNORMAL
APPEARANCE UR: ABNORMAL
BACTERIA #/AREA URNS HPF: ABNORMAL /HPF
BACTERIA #/AREA URNS HPF: ABNORMAL /HPF
BACTERIA UR CULT: ABNORMAL
BACTERIA UR CULT: NORMAL
BASOPHILS # BLD AUTO: 0.1 10E3/UL (ref 0–0.2)
BASOPHILS NFR BLD AUTO: 1 %
BILIRUB UR QL STRIP: NEGATIVE
BILIRUB UR QL STRIP: NEGATIVE
BUN SERPL-MCNC: 13 MG/DL (ref 8–28)
BUN SERPL-MCNC: 16 MG/DL (ref 8–28)
BUN SERPL-MCNC: 17 MG/DL (ref 8–28)
BUN SERPL-MCNC: 18 MG/DL (ref 8–28)
BUN SERPL-MCNC: 19 MG/DL (ref 8–28)
BUN SERPL-MCNC: 19 MG/DL (ref 8–28)
BUN SERPL-MCNC: 23 MG/DL (ref 8–28)
BUN SERPL-MCNC: 25 MG/DL (ref 8–28)
CALCIUM SERPL-MCNC: 10 MG/DL (ref 8.5–10.5)
CALCIUM SERPL-MCNC: 10.3 MG/DL (ref 8.5–10.5)
CALCIUM SERPL-MCNC: 8.9 MG/DL (ref 8.5–10.5)
CALCIUM SERPL-MCNC: 9.2 MG/DL (ref 8.5–10.5)
CALCIUM SERPL-MCNC: 9.4 MG/DL (ref 8.5–10.5)
CALCIUM SERPL-MCNC: 9.4 MG/DL (ref 8.5–10.5)
CALCIUM SERPL-MCNC: 9.5 MG/DL (ref 8.5–10.5)
CALCIUM SERPL-MCNC: 9.7 MG/DL (ref 8.5–10.5)
CHLORIDE BLD-SCNC: 101 MMOL/L (ref 98–107)
CHLORIDE BLD-SCNC: 102 MMOL/L (ref 98–107)
CHLORIDE BLD-SCNC: 103 MMOL/L (ref 98–107)
CHLORIDE BLD-SCNC: 104 MMOL/L (ref 98–107)
CHLORIDE BLD-SCNC: 106 MMOL/L (ref 98–107)
CHLORIDE BLD-SCNC: 109 MMOL/L (ref 98–107)
CHLORIDE BLD-SCNC: 110 MMOL/L (ref 98–107)
CHLORIDE BLD-SCNC: 99 MMOL/L (ref 98–107)
CO2 SERPL-SCNC: 24 MMOL/L (ref 22–31)
CO2 SERPL-SCNC: 24 MMOL/L (ref 22–31)
CO2 SERPL-SCNC: 27 MMOL/L (ref 22–31)
CO2 SERPL-SCNC: 28 MMOL/L (ref 22–31)
CO2 SERPL-SCNC: 29 MMOL/L (ref 22–31)
COLOR UR AUTO: ABNORMAL
COLOR UR AUTO: ABNORMAL
CREAT SERPL-MCNC: 0.79 MG/DL (ref 0.6–1.1)
CREAT SERPL-MCNC: 0.83 MG/DL (ref 0.6–1.1)
CREAT SERPL-MCNC: 0.83 MG/DL (ref 0.6–1.1)
CREAT SERPL-MCNC: 0.88 MG/DL (ref 0.6–1.1)
CREAT SERPL-MCNC: 0.9 MG/DL (ref 0.6–1.1)
CREAT SERPL-MCNC: 0.94 MG/DL (ref 0.6–1.1)
CREAT SERPL-MCNC: 0.97 MG/DL (ref 0.6–1.1)
CREAT SERPL-MCNC: 1 MG/DL (ref 0.6–1.1)
EOSINOPHIL # BLD AUTO: 0.2 10E3/UL (ref 0–0.7)
EOSINOPHIL NFR BLD AUTO: 3 %
ERYTHROCYTE [DISTWIDTH] IN BLOOD BY AUTOMATED COUNT: 13 % (ref 10–15)
ERYTHROCYTE [DISTWIDTH] IN BLOOD BY AUTOMATED COUNT: 13 % (ref 10–15)
ERYTHROCYTE [DISTWIDTH] IN BLOOD BY AUTOMATED COUNT: 13.1 % (ref 10–15)
ERYTHROCYTE [DISTWIDTH] IN BLOOD BY AUTOMATED COUNT: 13.6 % (ref 10–15)
ERYTHROCYTE [DISTWIDTH] IN BLOOD BY AUTOMATED COUNT: 13.7 % (ref 10–15)
ERYTHROCYTE [DISTWIDTH] IN BLOOD BY AUTOMATED COUNT: 13.9 % (ref 10–15)
ERYTHROCYTE [DISTWIDTH] IN BLOOD BY AUTOMATED COUNT: 14.3 % (ref 10–15)
GFR SERPL CREATININE-BSD FRML MDRD: 56 ML/MIN/1.73M2
GFR SERPL CREATININE-BSD FRML MDRD: 58 ML/MIN/1.73M2
GFR SERPL CREATININE-BSD FRML MDRD: 61 ML/MIN/1.73M2
GFR SERPL CREATININE-BSD FRML MDRD: 64 ML/MIN/1.73M2
GFR SERPL CREATININE-BSD FRML MDRD: 66 ML/MIN/1.73M2
GFR SERPL CREATININE-BSD FRML MDRD: 70 ML/MIN/1.73M2
GFR SERPL CREATININE-BSD FRML MDRD: 70 ML/MIN/1.73M2
GFR SERPL CREATININE-BSD FRML MDRD: 75 ML/MIN/1.73M2
GLUCOSE BLD-MCNC: 101 MG/DL (ref 70–125)
GLUCOSE BLD-MCNC: 102 MG/DL (ref 70–125)
GLUCOSE BLD-MCNC: 102 MG/DL (ref 70–125)
GLUCOSE BLD-MCNC: 110 MG/DL (ref 70–125)
GLUCOSE BLD-MCNC: 118 MG/DL (ref 70–125)
GLUCOSE BLD-MCNC: 118 MG/DL (ref 70–125)
GLUCOSE BLD-MCNC: 140 MG/DL (ref 70–125)
GLUCOSE BLD-MCNC: 87 MG/DL (ref 70–125)
GLUCOSE UR STRIP-MCNC: NEGATIVE MG/DL
GLUCOSE UR STRIP-MCNC: NEGATIVE MG/DL
HCT VFR BLD AUTO: 27.5 % (ref 35–47)
HCT VFR BLD AUTO: 28.1 % (ref 35–47)
HCT VFR BLD AUTO: 30.6 % (ref 35–47)
HCT VFR BLD AUTO: 34.8 % (ref 35–47)
HCT VFR BLD AUTO: 38.9 % (ref 35–47)
HCT VFR BLD AUTO: 38.9 % (ref 35–47)
HCT VFR BLD AUTO: 41.6 % (ref 35–47)
HGB BLD-MCNC: 11.2 G/DL (ref 11.7–15.7)
HGB BLD-MCNC: 12.4 G/DL (ref 11.7–15.7)
HGB BLD-MCNC: 12.6 G/DL (ref 11.7–15.7)
HGB BLD-MCNC: 12.8 G/DL (ref 11.7–15.7)
HGB BLD-MCNC: 8.7 G/DL (ref 11.7–15.7)
HGB BLD-MCNC: 8.8 G/DL (ref 11.7–15.7)
HGB BLD-MCNC: 9.5 G/DL (ref 11.7–15.7)
HGB UR QL STRIP: ABNORMAL
HGB UR QL STRIP: NEGATIVE
HYALINE CASTS: 6 /LPF
IMM GRANULOCYTES # BLD: 0 10E3/UL
IMM GRANULOCYTES NFR BLD: 0 %
KETONES UR STRIP-MCNC: NEGATIVE MG/DL
KETONES UR STRIP-MCNC: NEGATIVE MG/DL
LEUKOCYTE ESTERASE UR QL STRIP: ABNORMAL
LEUKOCYTE ESTERASE UR QL STRIP: ABNORMAL
LYMPHOCYTES # BLD AUTO: 1.3 10E3/UL (ref 0.8–5.3)
LYMPHOCYTES NFR BLD AUTO: 19 %
MCH RBC QN AUTO: 29.1 PG (ref 26.5–33)
MCH RBC QN AUTO: 30.6 PG (ref 26.5–33)
MCH RBC QN AUTO: 31.1 PG (ref 26.5–33)
MCH RBC QN AUTO: 31.1 PG (ref 26.5–33)
MCH RBC QN AUTO: 31.3 PG (ref 26.5–33)
MCH RBC QN AUTO: 31.4 PG (ref 26.5–33)
MCH RBC QN AUTO: 31.4 PG (ref 26.5–33)
MCHC RBC AUTO-ENTMCNC: 30.8 G/DL (ref 31.5–36.5)
MCHC RBC AUTO-ENTMCNC: 31 G/DL (ref 31.5–36.5)
MCHC RBC AUTO-ENTMCNC: 31.3 G/DL (ref 31.5–36.5)
MCHC RBC AUTO-ENTMCNC: 31.6 G/DL (ref 31.5–36.5)
MCHC RBC AUTO-ENTMCNC: 31.9 G/DL (ref 31.5–36.5)
MCHC RBC AUTO-ENTMCNC: 32.2 G/DL (ref 31.5–36.5)
MCHC RBC AUTO-ENTMCNC: 32.4 G/DL (ref 31.5–36.5)
MCV RBC AUTO: 95 FL (ref 78–100)
MCV RBC AUTO: 96 FL (ref 78–100)
MCV RBC AUTO: 98 FL (ref 78–100)
MCV RBC AUTO: 99 FL (ref 78–100)
MONOCYTES # BLD AUTO: 0.6 10E3/UL (ref 0–1.3)
MONOCYTES NFR BLD AUTO: 9 %
MUCOUS THREADS #/AREA URNS LPF: PRESENT /LPF
MUCOUS THREADS #/AREA URNS LPF: PRESENT /LPF
NEUTROPHILS # BLD AUTO: 4.5 10E3/UL (ref 1.6–8.3)
NEUTROPHILS NFR BLD AUTO: 68 %
NITRATE UR QL: NEGATIVE
NITRATE UR QL: POSITIVE
NRBC # BLD AUTO: 0 10E3/UL
NRBC BLD AUTO-RTO: 0 /100
PH UR STRIP: 5.5 [PH] (ref 5–7)
PH UR STRIP: 5.5 [PH] (ref 5–7)
PLATELET # BLD AUTO: 264 10E3/UL (ref 150–450)
PLATELET # BLD AUTO: 308 10E3/UL (ref 150–450)
PLATELET # BLD AUTO: 322 10E3/UL (ref 150–450)
PLATELET # BLD AUTO: 322 10E3/UL (ref 150–450)
PLATELET # BLD AUTO: 377 10E3/UL (ref 150–450)
PLATELET # BLD AUTO: 392 10E3/UL (ref 150–450)
PLATELET # BLD AUTO: 430 10E3/UL (ref 150–450)
POTASSIUM BLD-SCNC: 3.5 MMOL/L (ref 3.5–5)
POTASSIUM BLD-SCNC: 3.6 MMOL/L (ref 3.5–5)
POTASSIUM BLD-SCNC: 4 MMOL/L (ref 3.5–5)
POTASSIUM BLD-SCNC: 4.1 MMOL/L (ref 3.5–5)
POTASSIUM BLD-SCNC: 4.2 MMOL/L (ref 3.5–5)
RBC # BLD AUTO: 2.78 10E6/UL (ref 3.8–5.2)
RBC # BLD AUTO: 2.83 10E6/UL (ref 3.8–5.2)
RBC # BLD AUTO: 3.1 10E6/UL (ref 3.8–5.2)
RBC # BLD AUTO: 3.57 10E6/UL (ref 3.8–5.2)
RBC # BLD AUTO: 3.95 10E6/UL (ref 3.8–5.2)
RBC # BLD AUTO: 4.05 10E6/UL (ref 3.8–5.2)
RBC # BLD AUTO: 4.4 10E6/UL (ref 3.8–5.2)
RBC URINE: 0 /HPF
RBC URINE: 15 /HPF
SARS-COV-2 RNA RESP QL NAA+PROBE: POSITIVE
SODIUM SERPL-SCNC: 141 MMOL/L (ref 136–145)
SODIUM SERPL-SCNC: 142 MMOL/L (ref 136–145)
SODIUM SERPL-SCNC: 142 MMOL/L (ref 136–145)
SODIUM SERPL-SCNC: 143 MMOL/L (ref 136–145)
SODIUM SERPL-SCNC: 144 MMOL/L (ref 136–145)
SODIUM SERPL-SCNC: 144 MMOL/L (ref 136–145)
SP GR UR STRIP: 1.01 (ref 1–1.03)
SP GR UR STRIP: 1.01 (ref 1–1.03)
SQUAMOUS EPITHELIAL: 18 /HPF
SQUAMOUS EPITHELIAL: <1 /HPF
UROBILINOGEN UR STRIP-MCNC: <2 MG/DL
UROBILINOGEN UR STRIP-MCNC: <2 MG/DL
WBC # BLD AUTO: 5.2 10E3/UL (ref 4–11)
WBC # BLD AUTO: 5.3 10E3/UL (ref 4–11)
WBC # BLD AUTO: 5.4 10E3/UL (ref 4–11)
WBC # BLD AUTO: 6 10E3/UL (ref 4–11)
WBC # BLD AUTO: 6.7 10E3/UL (ref 4–11)
WBC # BLD AUTO: 6.7 10E3/UL (ref 4–11)
WBC # BLD AUTO: 8 10E3/UL (ref 4–11)
WBC URINE: 28 /HPF
WBC URINE: >182 /HPF

## 2022-01-01 PROCEDURE — P9603 ONE-WAY ALLOW PRORATED MILES: HCPCS | Mod: ORL | Performed by: NURSE PRACTITIONER

## 2022-01-01 PROCEDURE — P9604 ONE-WAY ALLOW PRORATED TRIP: HCPCS | Performed by: FAMILY MEDICINE

## 2022-01-01 PROCEDURE — 36415 COLL VENOUS BLD VENIPUNCTURE: CPT | Mod: ORL | Performed by: INTERNAL MEDICINE

## 2022-01-01 PROCEDURE — 85025 COMPLETE CBC W/AUTO DIFF WBC: CPT | Mod: ORL | Performed by: NURSE PRACTITIONER

## 2022-01-01 PROCEDURE — 85027 COMPLETE CBC AUTOMATED: CPT | Performed by: FAMILY MEDICINE

## 2022-01-01 PROCEDURE — U0003 INFECTIOUS AGENT DETECTION BY NUCLEIC ACID (DNA OR RNA); SEVERE ACUTE RESPIRATORY SYNDROME CORONAVIRUS 2 (SARS-COV-2) (CORONAVIRUS DISEASE [COVID-19]), AMPLIFIED PROBE TECHNIQUE, MAKING USE OF HIGH THROUGHPUT TECHNOLOGIES AS DESCRIBED BY CMS-2020-01-R: HCPCS | Mod: ORL | Performed by: NURSE PRACTITIONER

## 2022-01-01 PROCEDURE — P9604 ONE-WAY ALLOW PRORATED TRIP: HCPCS | Mod: ORL | Performed by: FAMILY MEDICINE

## 2022-01-01 PROCEDURE — 85027 COMPLETE CBC AUTOMATED: CPT | Mod: ORL | Performed by: NURSE PRACTITIONER

## 2022-01-01 PROCEDURE — 36415 COLL VENOUS BLD VENIPUNCTURE: CPT | Mod: ORL | Performed by: NURSE PRACTITIONER

## 2022-01-01 PROCEDURE — P9604 ONE-WAY ALLOW PRORATED TRIP: HCPCS | Mod: ORL | Performed by: NURSE PRACTITIONER

## 2022-01-01 PROCEDURE — 80048 BASIC METABOLIC PNL TOTAL CA: CPT | Performed by: FAMILY MEDICINE

## 2022-01-01 PROCEDURE — 36415 COLL VENOUS BLD VENIPUNCTURE: CPT | Performed by: FAMILY MEDICINE

## 2022-01-01 PROCEDURE — 87186 SC STD MICRODIL/AGAR DIL: CPT | Mod: ORL | Performed by: NURSE PRACTITIONER

## 2022-01-01 PROCEDURE — 80048 BASIC METABOLIC PNL TOTAL CA: CPT | Mod: ORL | Performed by: NURSE PRACTITIONER

## 2022-01-01 PROCEDURE — 87086 URINE CULTURE/COLONY COUNT: CPT | Mod: ORL | Performed by: NURSE PRACTITIONER

## 2022-01-01 PROCEDURE — 81001 URINALYSIS AUTO W/SCOPE: CPT | Mod: ORL | Performed by: NURSE PRACTITIONER

## 2022-01-01 PROCEDURE — 80048 BASIC METABOLIC PNL TOTAL CA: CPT | Mod: ORL | Performed by: INTERNAL MEDICINE

## 2022-01-01 PROCEDURE — 36415 COLL VENOUS BLD VENIPUNCTURE: CPT | Mod: ORL | Performed by: FAMILY MEDICINE

## 2022-01-01 PROCEDURE — P9604 ONE-WAY ALLOW PRORATED TRIP: HCPCS | Mod: ORL | Performed by: INTERNAL MEDICINE

## 2022-01-01 PROCEDURE — 85027 COMPLETE CBC AUTOMATED: CPT | Mod: ORL | Performed by: FAMILY MEDICINE
